# Patient Record
Sex: MALE | Race: WHITE | NOT HISPANIC OR LATINO
[De-identification: names, ages, dates, MRNs, and addresses within clinical notes are randomized per-mention and may not be internally consistent; named-entity substitution may affect disease eponyms.]

---

## 2022-05-04 ENCOUNTER — NON-APPOINTMENT (OUTPATIENT)
Age: 52
End: 2022-05-04

## 2022-05-04 ENCOUNTER — APPOINTMENT (OUTPATIENT)
Dept: OTOLARYNGOLOGY | Facility: CLINIC | Age: 52
End: 2022-05-04
Payer: SELF-PAY

## 2022-05-04 ENCOUNTER — TRANSCRIPTION ENCOUNTER (OUTPATIENT)
Age: 52
End: 2022-05-04

## 2022-05-04 VITALS
BODY MASS INDEX: 25.46 KG/M2 | WEIGHT: 168 LBS | HEART RATE: 100 BPM | SYSTOLIC BLOOD PRESSURE: 169 MMHG | RESPIRATION RATE: 16 BRPM | DIASTOLIC BLOOD PRESSURE: 92 MMHG | HEIGHT: 68 IN | TEMPERATURE: 98 F | OXYGEN SATURATION: 97 %

## 2022-05-04 DIAGNOSIS — H90.3 SENSORINEURAL HEARING LOSS, BILATERAL: ICD-10-CM

## 2022-05-04 DIAGNOSIS — Z87.442 PERSONAL HISTORY OF URINARY CALCULI: ICD-10-CM

## 2022-05-04 DIAGNOSIS — Z82.49 FAMILY HISTORY OF ISCHEMIC HEART DISEASE AND OTHER DISEASES OF THE CIRCULATORY SYSTEM: ICD-10-CM

## 2022-05-04 DIAGNOSIS — H91.90 UNSPECIFIED HEARING LOSS, UNSPECIFIED EAR: ICD-10-CM

## 2022-05-04 DIAGNOSIS — F17.210 NICOTINE DEPENDENCE, CIGARETTES, UNCOMPLICATED: ICD-10-CM

## 2022-05-04 DIAGNOSIS — Z72.89 OTHER PROBLEMS RELATED TO LIFESTYLE: ICD-10-CM

## 2022-05-04 DIAGNOSIS — Z83.3 FAMILY HISTORY OF DIABETES MELLITUS: ICD-10-CM

## 2022-05-04 DIAGNOSIS — Z80.9 FAMILY HISTORY OF MALIGNANT NEOPLASM, UNSPECIFIED: ICD-10-CM

## 2022-05-04 PROCEDURE — 92557 COMPREHENSIVE HEARING TEST: CPT

## 2022-05-04 PROCEDURE — 99203 OFFICE O/P NEW LOW 30 MIN: CPT

## 2022-05-04 PROCEDURE — 92550 TYMPANOMETRY & REFLEX THRESH: CPT

## 2022-05-04 NOTE — PHYSICAL EXAM
[Midline] : trachea located in midline position [Normal] : no rashes [de-identified] : gait steady

## 2022-05-04 NOTE — HISTORY OF PRESENT ILLNESS
[de-identified] : 53 y/o M presenting with b hearing loss for more than the past 10 years. He frequently asks people to repeat themselves. 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 This occurred when he was in his 20's; currently he uses ear protection. He denies tinnitus or dizziness. He denies inciting event. His father is 81 y/o, he has hl and wears HA. No other FH or SH pertinent to cc. Nonsmoker Denies fevers, chills, sweats.

## 2022-05-04 NOTE — ASSESSMENT
[FreeTextEntry1] : sensorineural hearing loss, likely from noise exposure\par reviewed with pt and urged to continue to use noise protection when in loud envt\par discussed hae and is is amenable\par taken to Audiology to discuss further and arrange HAE\par rtc 1 yr with [facility]

## 2022-05-16 ENCOUNTER — APPOINTMENT (OUTPATIENT)
Dept: OTOLARYNGOLOGY | Facility: CLINIC | Age: 52
End: 2022-05-16
Payer: COMMERCIAL

## 2022-05-16 ENCOUNTER — APPOINTMENT (OUTPATIENT)
Dept: PHARMACY | Facility: CLINIC | Age: 52
End: 2022-05-16

## 2022-05-16 PROCEDURE — V5010 ASSESSMENT FOR HEARING AID: CPT

## 2022-05-16 PROCEDURE — V5090: CPT

## 2022-06-08 ENCOUNTER — APPOINTMENT (OUTPATIENT)
Dept: OTOLARYNGOLOGY | Facility: CLINIC | Age: 52
End: 2022-06-08
Payer: COMMERCIAL

## 2022-06-08 PROCEDURE — V5299A: CUSTOM | Mod: NC

## 2022-06-08 PROCEDURE — V5299B: CUSTOM

## 2022-06-15 ENCOUNTER — APPOINTMENT (OUTPATIENT)
Dept: OTOLARYNGOLOGY | Facility: CLINIC | Age: 52
End: 2022-06-15
Payer: COMMERCIAL

## 2022-06-15 PROCEDURE — V5261D: CUSTOM

## 2022-07-13 ENCOUNTER — APPOINTMENT (OUTPATIENT)
Dept: OTOLARYNGOLOGY | Facility: CLINIC | Age: 52
End: 2022-07-13

## 2022-07-13 PROCEDURE — V5299A: CUSTOM | Mod: NC

## 2023-02-28 ENCOUNTER — FORM ENCOUNTER (OUTPATIENT)
Age: 53
End: 2023-02-28

## 2023-02-28 DIAGNOSIS — R06.02 SHORTNESS OF BREATH: ICD-10-CM

## 2023-03-01 ENCOUNTER — NON-APPOINTMENT (OUTPATIENT)
Age: 53
End: 2023-03-01

## 2023-03-01 ENCOUNTER — RESULT REVIEW (OUTPATIENT)
Age: 53
End: 2023-03-01

## 2023-03-01 ENCOUNTER — OUTPATIENT (OUTPATIENT)
Dept: OUTPATIENT SERVICES | Facility: HOSPITAL | Age: 53
LOS: 1 days | End: 2023-03-01
Payer: COMMERCIAL

## 2023-03-01 ENCOUNTER — APPOINTMENT (OUTPATIENT)
Dept: HEART AND VASCULAR | Facility: CLINIC | Age: 53
End: 2023-03-01
Payer: COMMERCIAL

## 2023-03-01 VITALS
OXYGEN SATURATION: 98 % | TEMPERATURE: 98 F | HEIGHT: 68 IN | SYSTOLIC BLOOD PRESSURE: 130 MMHG | WEIGHT: 167 LBS | BODY MASS INDEX: 25.31 KG/M2 | HEART RATE: 86 BPM | DIASTOLIC BLOOD PRESSURE: 90 MMHG

## 2023-03-01 DIAGNOSIS — R06.02 SHORTNESS OF BREATH: ICD-10-CM

## 2023-03-01 DIAGNOSIS — I10 ESSENTIAL (PRIMARY) HYPERTENSION: ICD-10-CM

## 2023-03-01 PROCEDURE — A9500: CPT

## 2023-03-01 PROCEDURE — 78452 HT MUSCLE IMAGE SPECT MULT: CPT | Mod: 26

## 2023-03-01 PROCEDURE — 93016 CV STRESS TEST SUPVJ ONLY: CPT

## 2023-03-01 PROCEDURE — 78452 HT MUSCLE IMAGE SPECT MULT: CPT

## 2023-03-01 PROCEDURE — 93017 CV STRESS TEST TRACING ONLY: CPT

## 2023-03-01 PROCEDURE — 99204 OFFICE O/P NEW MOD 45 MIN: CPT

## 2023-03-01 PROCEDURE — 93018 CV STRESS TEST I&R ONLY: CPT

## 2023-03-01 NOTE — HISTORY OF PRESENT ILLNESS
[FreeTextEntry1] : 52 M w/ +tobacco use, strong family hx of CAD, carotid disease presents for initial evaluation. ROS  + dyspnea with exertion. No chest pain. +tobacco use 0.5 packs / day x 30 years. \par \par Meds: ASA \par \par NST 03/02/23: normal perfusion

## 2023-06-08 ENCOUNTER — NON-APPOINTMENT (OUTPATIENT)
Age: 53
End: 2023-06-08

## 2023-06-14 ENCOUNTER — APPOINTMENT (OUTPATIENT)
Dept: HEART AND VASCULAR | Facility: CLINIC | Age: 53
End: 2023-06-14
Payer: COMMERCIAL

## 2023-06-14 VITALS
DIASTOLIC BLOOD PRESSURE: 70 MMHG | HEIGHT: 68 IN | OXYGEN SATURATION: 98 % | WEIGHT: 154 LBS | BODY MASS INDEX: 23.34 KG/M2 | SYSTOLIC BLOOD PRESSURE: 130 MMHG | HEART RATE: 83 BPM

## 2023-06-14 DIAGNOSIS — D50.9 IRON DEFICIENCY ANEMIA, UNSPECIFIED: ICD-10-CM

## 2023-06-14 PROCEDURE — 99214 OFFICE O/P EST MOD 30 MIN: CPT

## 2023-06-14 PROCEDURE — 99406 BEHAV CHNG SMOKING 3-10 MIN: CPT

## 2023-06-14 RX ORDER — CHLORHEXIDINE GLUCONATE 4 %
1000 LIQUID (ML) TOPICAL DAILY
Refills: 0 | Status: ACTIVE | COMMUNITY
Start: 2023-06-14

## 2023-06-14 RX ORDER — ROSUVASTATIN CALCIUM 20 MG/1
20 TABLET, FILM COATED ORAL DAILY
Qty: 90 | Refills: 3 | Status: ACTIVE | COMMUNITY
Start: 2023-03-01

## 2023-06-14 RX ORDER — COLD-HOT PACK
125 MCG EACH MISCELLANEOUS DAILY
Refills: 0 | Status: ACTIVE | COMMUNITY
Start: 2023-06-14

## 2023-06-14 RX ORDER — CHLORHEXIDINE GLUCONATE 4 %
325 (65 FE) LIQUID (ML) TOPICAL TWICE DAILY
Qty: 180 | Refills: 0 | Status: ACTIVE | COMMUNITY

## 2023-06-14 RX ORDER — FLUTICASONE PROPIONATE 50 UG/1
50 SPRAY, METERED NASAL
Refills: 0 | Status: ACTIVE | COMMUNITY

## 2023-06-14 RX ORDER — LIFITEGRAST 50 MG/ML
5 SOLUTION/ DROPS OPHTHALMIC
Refills: 0 | Status: DISCONTINUED | COMMUNITY
End: 2023-06-14

## 2023-06-14 RX ORDER — MULTIVIT-MIN/FOLIC/VIT K/LYCOP 400-300MCG
1000 TABLET ORAL DAILY
Refills: 0 | Status: ACTIVE | COMMUNITY
Start: 2023-06-14

## 2023-06-29 NOTE — REASON FOR VISIT
[Hyperlipidemia] : hyperlipidemia [Hypertension] : hypertension [Coronary Artery Disease] : coronary artery disease [Carotid, Aortic and Peripheral Vascular Disease] : carotid, aortic and peripheral vascular disease [FreeTextEntry1] : EKG:\par 03/01/2023: Sinus rhythm w/ RBBB\par --------------------------------------------------\par Stress Test:\par 05/02/2023: EF 60%. MPI normal. LVSF is normal w/ no RWMA. EKG is non-diagnostic 2/2 marked repolarization abnormalities from RBBB baseline.\par ------------------------------------------------

## 2023-06-29 NOTE — COUNSELING
[Cessation strategies including cessation program discussed] : Cessation strategies including cessation program discussed [Yes] : Willing to quit smoking [FreeTextEntry2] : refused [FreeTextEntry1] : 5

## 2023-06-29 NOTE — ASSESSMENT
[FreeTextEntry1] : HTN: BP at ACC/AHA 2017 guideline target\par - Continue Amlodipine 2.5mg daily\par - Counseled to limit dietary salt intake.\par - Obtain TTE to evaluate extent of hypertensive heart disease.\par \par Hyperlipidemia: Lipids at goal\par - Continue Crestor 20mg daily\par - Discussed therapeutic lifestyle changes to promote improved lipid metabolism (low fat, low cholesterol heart healthy diet, striving for optimal weight control, and aerobic exercises as tolerated)\par - Recent labs drawn and patient will fax reults to the office.\par \par Carotid artery disease: He remarks on having been told about carotid disease and had a head and neck MRI. \par - Continue ASA 81 mg daily for now.\par - Pt will fax MRI of head and Neck and ? carotid Usg for my review\par \par Anemia:\par - Continue FeSO4 as ordered.\par - Discussed increasing iron rich foods and being well hydrated to avoid constipation.\par \par Clearance for  lipoma removal with Dr. Guerra at [facility] on July 24th, 2023 : \par - Scheduled for low risk surgery and carries minor pre-op CV risk\par - RCR1 Class 1 risk, has > 12 METS functional capacity and has no acute cardiac complaints.\par - He underwent stress test on 03/02/2023 which revealed EF 60%. MPI normal. LVSF is normal w/ no RWMA.\par -Hold  ASA  7 days prior to the procedure and restart when surgically cleared.\par - He may proceed with no cardiac contraindications.\par

## 2023-06-29 NOTE — HISTORY OF PRESENT ILLNESS
[FreeTextEntry1] : Segundo Monsivais is a 53 year-old male smoker with a strong family hx of CAD presents here for management of HTN, HLD and for pre-op clearance. Since his last visit he has been well with no CV complaints. He has modified his lifestyle with regards to diet and exercise. He increased his fiber, decreased carbohydrates, eats leaner meat, and walks numerous [facility] blocks daily. He has lost weight and lipid levels have improved. He has a left groin lipoma which he will have removed. Pt denies pain and/or discomfort. \par \par \par \par

## 2023-07-05 ENCOUNTER — APPOINTMENT (OUTPATIENT)
Dept: OTOLARYNGOLOGY | Facility: CLINIC | Age: 53
End: 2023-07-05

## 2023-07-05 ENCOUNTER — APPOINTMENT (OUTPATIENT)
Dept: PHARMACY | Facility: CLINIC | Age: 53
End: 2023-07-05
Payer: SELF-PAY

## 2023-07-05 PROCEDURE — V5299A: CUSTOM | Mod: NC

## 2023-08-09 ENCOUNTER — NON-APPOINTMENT (OUTPATIENT)
Age: 53
End: 2023-08-09

## 2023-08-10 ENCOUNTER — APPOINTMENT (OUTPATIENT)
Dept: HEART AND VASCULAR | Facility: CLINIC | Age: 53
End: 2023-08-10
Payer: COMMERCIAL

## 2023-08-10 PROCEDURE — 93306 TTE W/DOPPLER COMPLETE: CPT

## 2023-09-06 ENCOUNTER — APPOINTMENT (OUTPATIENT)
Dept: HEART AND VASCULAR | Facility: CLINIC | Age: 53
End: 2023-09-06
Payer: COMMERCIAL

## 2023-09-06 ENCOUNTER — NON-APPOINTMENT (OUTPATIENT)
Age: 53
End: 2023-09-06

## 2023-09-06 VITALS
SYSTOLIC BLOOD PRESSURE: 121 MMHG | BODY MASS INDEX: 23.34 KG/M2 | DIASTOLIC BLOOD PRESSURE: 74 MMHG | OXYGEN SATURATION: 97 % | RESPIRATION RATE: 16 BRPM | WEIGHT: 154 LBS | HEART RATE: 87 BPM | HEIGHT: 68 IN

## 2023-09-06 PROCEDURE — 99214 OFFICE O/P EST MOD 30 MIN: CPT | Mod: 25

## 2023-09-06 PROCEDURE — 93000 ELECTROCARDIOGRAM COMPLETE: CPT

## 2023-09-06 RX ORDER — ALFUZOSIN HYDROCHLORIDE 10 MG/1
10 TABLET, EXTENDED RELEASE ORAL DAILY
Refills: 0 | Status: ACTIVE | COMMUNITY

## 2023-09-06 NOTE — COUNSELING
[Cessation strategies including cessation program discussed] : Cessation strategies including cessation program discussed [Smoking Cessation Program Referral] : Smoking Cessation Program Referral  [Yes] : Willing to quit smoking [FreeTextEntry1] : 5 unknown

## 2023-09-06 NOTE — DISCUSSION/SUMMARY
[EKG obtained to assist in diagnosis and management of assessed problem(s)] : EKG obtained to assist in diagnosis and management of assessed problem(s) [FreeTextEntry1] : All relevant risks and benefits discussed. Patient verbalizes understanding and agreement with plan.

## 2023-09-06 NOTE — REASON FOR VISIT
[Hyperlipidemia] : hyperlipidemia [Hypertension] : hypertension [Coronary Artery Disease] : coronary artery disease [Carotid, Aortic and Peripheral Vascular Disease] : carotid, aortic and peripheral vascular disease [FreeTextEntry1] : ============================ EK2023: Sinus rhythm w/ RBBB, LAE 2023: Sinus rhythm w/ RBBB, LAE -------------------------------------------------- Stress Test: 2023: EF 60%. MPI normal. LVSF is normal w/ no RWMA. EKG is non-diagnostic 2/2 marked repolarization abnormalities from RBBB baseline. ------------------------------------------------ ECHO: 08/10/2023: EF 66%. Normal LVSF. Mild LVH. AS with mild to mod AR.

## 2023-09-06 NOTE — ASSESSMENT
[FreeTextEntry1] : HTN: BP at ACC/AHA 2017 guideline target - Continue Amlodipine 2.5mg daily - Counseled to limit dietary salt intake.  Hyperlipidemia: HDL 50, ,  (3/23) - Continue Crestor 20mg daily - Pt had recent lipid levels drawn and will fax in for my review.  - Will add zetia if needed pending labs. - Discussed therapeutic lifestyle changes to promote improved lipid metabolism (low fat, low cholesterol heart healthy diet, striving for optimal weight control, and aerobic exercises as tolerated)  Carotid artery disease: He remarks on having been told about carotid disease and had a head and neck MRI.  - Continue ASA 81 mg daily for now. - Pt will fax MRI of head and Neck and carotid Usg for my review. - Will obtain Carotid Usg next visit if no report found.  Anemia: - Continue FeSO4 as ordered. - Discussed increasing iron rich foods and being well hydrated to avoid constipation.

## 2023-11-22 ENCOUNTER — NON-APPOINTMENT (OUTPATIENT)
Age: 53
End: 2023-11-22

## 2023-11-22 ENCOUNTER — APPOINTMENT (OUTPATIENT)
Dept: HEART AND VASCULAR | Facility: CLINIC | Age: 53
End: 2023-11-22
Payer: COMMERCIAL

## 2023-11-22 VITALS
WEIGHT: 162 LBS | DIASTOLIC BLOOD PRESSURE: 80 MMHG | HEIGHT: 68 IN | BODY MASS INDEX: 24.55 KG/M2 | TEMPERATURE: 98 F | HEART RATE: 88 BPM | OXYGEN SATURATION: 99 % | SYSTOLIC BLOOD PRESSURE: 130 MMHG

## 2023-11-22 VITALS — DIASTOLIC BLOOD PRESSURE: 80 MMHG | SYSTOLIC BLOOD PRESSURE: 120 MMHG

## 2023-11-22 PROCEDURE — 99214 OFFICE O/P EST MOD 30 MIN: CPT | Mod: 25

## 2023-11-22 PROCEDURE — 93000 ELECTROCARDIOGRAM COMPLETE: CPT

## 2023-11-22 RX ORDER — VARENICLINE 1 MG/1
1 TABLET, FILM COATED ORAL TWICE DAILY
Refills: 0 | Status: ACTIVE | COMMUNITY
Start: 2023-11-22

## 2024-02-13 ENCOUNTER — APPOINTMENT (OUTPATIENT)
Dept: OTOLARYNGOLOGY | Facility: CLINIC | Age: 54
End: 2024-02-13

## 2024-02-21 ENCOUNTER — APPOINTMENT (OUTPATIENT)
Dept: OTOLARYNGOLOGY | Facility: CLINIC | Age: 54
End: 2024-02-21
Payer: SELF-PAY

## 2024-02-21 PROCEDURE — V5299A: CUSTOM | Mod: NC

## 2024-03-07 ENCOUNTER — APPOINTMENT (OUTPATIENT)
Dept: HEART AND VASCULAR | Facility: CLINIC | Age: 54
End: 2024-03-07
Payer: COMMERCIAL

## 2024-03-07 VITALS
DIASTOLIC BLOOD PRESSURE: 79 MMHG | OXYGEN SATURATION: 96 % | RESPIRATION RATE: 16 BRPM | WEIGHT: 160 LBS | HEART RATE: 97 BPM | BODY MASS INDEX: 24.25 KG/M2 | SYSTOLIC BLOOD PRESSURE: 140 MMHG | HEIGHT: 68 IN

## 2024-03-07 VITALS — SYSTOLIC BLOOD PRESSURE: 126 MMHG | DIASTOLIC BLOOD PRESSURE: 88 MMHG

## 2024-03-07 DIAGNOSIS — I10 ESSENTIAL (PRIMARY) HYPERTENSION: ICD-10-CM

## 2024-03-07 DIAGNOSIS — I77.9 DISORDER OF ARTERIES AND ARTERIOLES, UNSPECIFIED: ICD-10-CM

## 2024-03-07 DIAGNOSIS — E78.5 HYPERLIPIDEMIA, UNSPECIFIED: ICD-10-CM

## 2024-03-07 PROCEDURE — 99214 OFFICE O/P EST MOD 30 MIN: CPT

## 2024-03-07 RX ORDER — EZETIMIBE 10 MG/1
10 TABLET ORAL DAILY
Qty: 90 | Refills: 3 | Status: ACTIVE | COMMUNITY
Start: 2024-03-07 | End: 1900-01-01

## 2024-03-07 RX ORDER — AMLODIPINE BESYLATE 5 MG/1
5 TABLET ORAL DAILY
Qty: 1 | Refills: 3 | Status: ACTIVE | COMMUNITY
Start: 2023-06-14 | End: 1900-01-01

## 2024-03-07 NOTE — HISTORY OF PRESENT ILLNESS
[FreeTextEntry1] : Segundo Monsivais is a [facility] 54-year-old male who presents here for management of ERENDIRA, HTN, and HLD. Since his last visit he has been well with no CV complaints. He has modified his lifestyle with regards to diet and exercise. He increased his fiber, decreased carbohydrates, eats leaner meat, and walks numerous [facility] blocks daily. Pt states he is seeing a hematologist for lupus like anticoagulants. He is doing well s/p cholecystectomy.

## 2024-03-07 NOTE — REASON FOR VISIT
[Hyperlipidemia] : hyperlipidemia [Hypertension] : hypertension [Coronary Artery Disease] : coronary artery disease [FreeTextEntry1] : ============================ EK2023:  Sinus rhythm w/ RBBB, LAE 2023: Sinus rhythm w/ RBBB, LAE 2023: Sinus rhythm w/ RBBB, LAE -------------------------------------------------- Stress Test: 2023: EF 60%. MPI normal. LVSF is normal w/ no RWMA. EKG is non-diagnostic 2/2 marked repolarization abnormalities from RBBB baseline. ------------------------------------------------ ECHO: 08/10/2023: EF 66%. Normal LVSF. Mild LVH. AS with mild to mod AR.

## 2024-03-07 NOTE — ASSESSMENT
[FreeTextEntry1] : =============================================== HTN: BP at ACC/AHA 2017 guideline target - Continue Amlodipine 5mg daily - Counseled to limit dietary salt intake.  Hyperlipidemia: HDL 54, ,  (2/24) - Continue Crestor 20mg daily - Start Zetia 10mg daily  - Discussed therapeutic lifestyle changes to promote improved lipid metabolism (low fat, low cholesterol heart healthy diet, striving for optimal weight control, and aerobic exercises as tolerated)  Carotid artery disease: He remarks on having been told about carotid disease and had a head and neck MRI. - Continue ASA 81 mg daily for now. - Pt will fax repeat carotid Usg for my review.  Anemia: Hgb 16.0, Hct 46 (2/2024) - Continue FeSO4 as ordered. - Discussed increasing iron rich foods and being well hydrated to avoid constipation.

## 2024-03-12 ENCOUNTER — APPOINTMENT (OUTPATIENT)
Dept: OTOLARYNGOLOGY | Facility: CLINIC | Age: 54
End: 2024-03-12
Payer: COMMERCIAL

## 2024-03-12 PROCEDURE — 92557 COMPREHENSIVE HEARING TEST: CPT

## 2024-03-12 PROCEDURE — V5299A: CUSTOM | Mod: NC

## 2024-03-12 PROCEDURE — 92550 TYMPANOMETRY & REFLEX THRESH: CPT | Mod: 52

## 2024-05-14 ENCOUNTER — OUTPATIENT (OUTPATIENT)
Dept: OUTPATIENT SERVICES | Facility: HOSPITAL | Age: 54
LOS: 1 days | End: 2024-05-14
Payer: COMMERCIAL

## 2024-05-14 ENCOUNTER — APPOINTMENT (OUTPATIENT)
Dept: NUCLEAR MEDICINE | Facility: HOSPITAL | Age: 54
End: 2024-05-14

## 2024-05-15 ENCOUNTER — APPOINTMENT (OUTPATIENT)
Dept: NUCLEAR MEDICINE | Facility: HOSPITAL | Age: 54
End: 2024-05-15

## 2024-05-15 PROCEDURE — A9582: CPT

## 2024-05-15 PROCEDURE — 78830 RP LOCLZJ TUM SPECT W/CT 1: CPT

## 2024-05-15 PROCEDURE — 78830 RP LOCLZJ TUM SPECT W/CT 1: CPT | Mod: 26

## 2024-05-15 PROCEDURE — 78802 RP LOCLZJ TUM WHBDY 1 D IMG: CPT

## 2024-05-15 PROCEDURE — 78802 RP LOCLZJ TUM WHBDY 1 D IMG: CPT | Mod: 26

## 2024-09-10 ENCOUNTER — APPOINTMENT (OUTPATIENT)
Dept: OTOLARYNGOLOGY | Facility: CLINIC | Age: 54
End: 2024-09-10
Payer: COMMERCIAL

## 2024-09-10 PROCEDURE — V5299A: CUSTOM

## 2024-09-12 ENCOUNTER — APPOINTMENT (OUTPATIENT)
Dept: HEART AND VASCULAR | Facility: CLINIC | Age: 54
End: 2024-09-12
Payer: COMMERCIAL

## 2024-09-12 ENCOUNTER — NON-APPOINTMENT (OUTPATIENT)
Age: 54
End: 2024-09-12

## 2024-09-12 VITALS
DIASTOLIC BLOOD PRESSURE: 86 MMHG | OXYGEN SATURATION: 96 % | SYSTOLIC BLOOD PRESSURE: 146 MMHG | HEIGHT: 68 IN | BODY MASS INDEX: 25.61 KG/M2 | TEMPERATURE: 98.4 F | HEART RATE: 101 BPM | WEIGHT: 169 LBS

## 2024-09-12 VITALS — SYSTOLIC BLOOD PRESSURE: 126 MMHG | DIASTOLIC BLOOD PRESSURE: 87 MMHG

## 2024-09-12 DIAGNOSIS — I10 ESSENTIAL (PRIMARY) HYPERTENSION: ICD-10-CM

## 2024-09-12 DIAGNOSIS — I77.9 DISORDER OF ARTERIES AND ARTERIOLES, UNSPECIFIED: ICD-10-CM

## 2024-09-12 DIAGNOSIS — E78.5 HYPERLIPIDEMIA, UNSPECIFIED: ICD-10-CM

## 2024-09-12 PROCEDURE — 99406 BEHAV CHNG SMOKING 3-10 MIN: CPT | Mod: 25

## 2024-09-12 PROCEDURE — G2211 COMPLEX E/M VISIT ADD ON: CPT | Mod: NC

## 2024-09-12 PROCEDURE — 93000 ELECTROCARDIOGRAM COMPLETE: CPT

## 2024-09-12 PROCEDURE — 99214 OFFICE O/P EST MOD 30 MIN: CPT | Mod: 25

## 2024-09-12 RX ORDER — BUDESONIDE AND FORMOTEROL FUMARATE DIHYDRATE 160; 4.5 UG/1; UG/1
160-4.5 AEROSOL RESPIRATORY (INHALATION) TWICE DAILY
Refills: 0 | Status: ACTIVE | COMMUNITY
Start: 2024-09-12

## 2024-09-12 NOTE — ASSESSMENT
[FreeTextEntry1] : =============================================== HTN: BP at ACC/AHA 2017 guideline target - Continue Amlodipine 5mg daily - Counseled to limit dietary salt intake.  Hyperlipidemia: HDL 54, ,  (2/24) - Continue Crestor 20mg daily - Continue Zetia 10mg daily  - Discussed therapeutic lifestyle changes to promote improved lipid metabolism (low fat, low cholesterol heart healthy diet, striving for optimal weight control, and aerobic exercises as tolerated)  Lupus anticoagulant: - Continue ASA 81 mg daily. - Follow up with hematologist  Fe Anemia: - continue FeSO4 1 tab twice daily

## 2024-09-12 NOTE — HISTORY OF PRESENT ILLNESS
[FreeTextEntry1] : Segundo Monsivais is a [facility] 54-year-old male who presents here for management of ERENDIRA, HTN, and HLD. Since his last visit he has been well with no CV complaints. He has modified his lifestyle with regards to diet and exercise. He increased his fiber, decreased carbohydrates, eats leaner meat, and walks numerous [facility] blocks daily. Carotid artery disease: He reports normal carotid imaging and brain MRI. Pt states he is seeing a hematologist for lupus like anticoagulants. He is doing well with no CV symptoms. Pt has been cutting down with his smoking to a few cigarettes a week. He is here for follow up.

## 2024-09-12 NOTE — REASON FOR VISIT
[Hyperlipidemia] : hyperlipidemia [Hypertension] : hypertension [Coronary Artery Disease] : coronary artery disease [FreeTextEntry1] : ============================ EK2024:  Sinus rhythm w/ RBBB 2023:  Sinus rhythm w/ RBBB, LAE 2023: Sinus rhythm w/ RBBB, LAE 2023: Sinus rhythm w/ RBBB, LAE -------------------------------------------------- Stress Test: 2023: EF 60%. MPI normal. LVSF is normal w/ no RWMA. EKG is non-diagnostic 2/2 marked repolarization abnormalities from RBBB baseline. ------------------------------------------------ ECHO: 08/10/2023: EF 66%. Normal LVSF. Mild LVH. AS with mild to mod AR.

## 2024-09-12 NOTE — HISTORY OF PRESENT ILLNESS
[FreeTextEntry1] : Segundo Monsivais is a [facility] 54-year-old male who presents here for management of ERENDIRA, HTN, and HLD. Since his last visit he has been well with no CV complaints. He has modified his lifestyle with regards to diet and exercise. He increased his fiber, decreased carbohydrates, eats leaner meat, and walks numerous [facility] blocks daily. Carotid artery disease: He reports normal carotid imaging and brain MRI. Pt states he is seeing a hematologist for lupus like anticoagulants. He is doing well with no CV symptoms. Pt has been cutting down with his smoking to a few cigarettes a week. He is here for follow up.